# Patient Record
Sex: MALE | Race: WHITE | Employment: OTHER | URBAN - METROPOLITAN AREA
[De-identification: names, ages, dates, MRNs, and addresses within clinical notes are randomized per-mention and may not be internally consistent; named-entity substitution may affect disease eponyms.]

---

## 2018-09-02 ENCOUNTER — HOSPITAL ENCOUNTER (EMERGENCY)
Age: 63
Discharge: HOME | End: 2018-09-02
Attending: EMERGENCY MEDICINE
Payer: COMMERCIAL

## 2018-09-02 ENCOUNTER — APPOINTMENT (OUTPATIENT)
Dept: CT IMAGING | Age: 63
End: 2018-09-02
Payer: COMMERCIAL

## 2018-09-02 ENCOUNTER — APPOINTMENT (OUTPATIENT)
Dept: GENERAL RADIOLOGY | Age: 63
End: 2018-09-02
Payer: COMMERCIAL

## 2018-09-02 VITALS
HEIGHT: 68 IN | OXYGEN SATURATION: 100 % | TEMPERATURE: 99.3 F | RESPIRATION RATE: 18 BRPM | BODY MASS INDEX: 30.01 KG/M2 | SYSTOLIC BLOOD PRESSURE: 143 MMHG | HEART RATE: 79 BPM | WEIGHT: 198 LBS | DIASTOLIC BLOOD PRESSURE: 83 MMHG

## 2018-09-02 DIAGNOSIS — R31.9 HEMATURIA, UNSPECIFIED TYPE: ICD-10-CM

## 2018-09-02 DIAGNOSIS — J18.9 PNEUMONIA DUE TO ORGANISM: Primary | ICD-10-CM

## 2018-09-02 DIAGNOSIS — R91.1 PULMONARY NODULE: ICD-10-CM

## 2018-09-02 LAB
A/G RATIO: 1.3 (ref 1.1–2.2)
ALBUMIN SERPL-MCNC: 4.3 G/DL (ref 3.4–5)
ALP BLD-CCNC: 79 U/L (ref 40–129)
ALT SERPL-CCNC: 13 U/L (ref 10–40)
ANION GAP SERPL CALCULATED.3IONS-SCNC: 10 MMOL/L (ref 3–16)
AST SERPL-CCNC: 18 U/L (ref 15–37)
BACTERIA: ABNORMAL /HPF
BASOPHILS ABSOLUTE: 0 K/UL (ref 0–0.2)
BASOPHILS RELATIVE PERCENT: 0.3 %
BILIRUB SERPL-MCNC: 1.6 MG/DL (ref 0–1)
BILIRUBIN URINE: NEGATIVE
BLOOD, URINE: ABNORMAL
BUN BLDV-MCNC: 17 MG/DL (ref 7–20)
CALCIUM SERPL-MCNC: 9.2 MG/DL (ref 8.3–10.6)
CHLORIDE BLD-SCNC: 98 MMOL/L (ref 99–110)
CLARITY: CLEAR
CO2: 27 MMOL/L (ref 21–32)
COLOR: YELLOW
CREAT SERPL-MCNC: 0.7 MG/DL (ref 0.8–1.3)
EOSINOPHILS ABSOLUTE: 0 K/UL (ref 0–0.6)
EOSINOPHILS RELATIVE PERCENT: 0.2 %
GFR AFRICAN AMERICAN: >60
GFR NON-AFRICAN AMERICAN: >60
GLOBULIN: 3.3 G/DL
GLUCOSE BLD-MCNC: 110 MG/DL (ref 70–99)
GLUCOSE URINE: NEGATIVE MG/DL
HCT VFR BLD CALC: 47.3 % (ref 40.5–52.5)
HEMOGLOBIN: 16.6 G/DL (ref 13.5–17.5)
INR BLD: 1.33 (ref 0.86–1.14)
KETONES, URINE: 15 MG/DL
LACTIC ACID: 1 MMOL/L (ref 0.4–2)
LEUKOCYTE ESTERASE, URINE: ABNORMAL
LYMPHOCYTES ABSOLUTE: 0.9 K/UL (ref 1–5.1)
LYMPHOCYTES RELATIVE PERCENT: 6.9 %
MCH RBC QN AUTO: 30 PG (ref 26–34)
MCHC RBC AUTO-ENTMCNC: 35.2 G/DL (ref 31–36)
MCV RBC AUTO: 85.4 FL (ref 80–100)
MICROSCOPIC EXAMINATION: YES
MONOCYTES ABSOLUTE: 1.2 K/UL (ref 0–1.3)
MONOCYTES RELATIVE PERCENT: 9 %
MUCUS: ABNORMAL /LPF
NEUTROPHILS ABSOLUTE: 11.3 K/UL (ref 1.7–7.7)
NEUTROPHILS RELATIVE PERCENT: 83.6 %
NITRITE, URINE: NEGATIVE
PDW BLD-RTO: 13 % (ref 12.4–15.4)
PH UA: 6
PLATELET # BLD: 150 K/UL (ref 135–450)
PMV BLD AUTO: 6.7 FL (ref 5–10.5)
POTASSIUM SERPL-SCNC: 4.9 MMOL/L (ref 3.5–5.1)
PROTEIN UA: 30 MG/DL
PROTHROMBIN TIME: 15.2 SEC (ref 9.8–13)
RBC # BLD: 5.54 M/UL (ref 4.2–5.9)
RBC UA: ABNORMAL /HPF (ref 0–2)
SODIUM BLD-SCNC: 135 MMOL/L (ref 136–145)
SPECIFIC GRAVITY UA: 1.02
TOTAL PROTEIN: 7.6 G/DL (ref 6.4–8.2)
URINE REFLEX TO CULTURE: YES
URINE TYPE: ABNORMAL
UROBILINOGEN, URINE: 0.2 E.U./DL
WBC # BLD: 13.5 K/UL (ref 4–11)
WBC UA: ABNORMAL /HPF (ref 0–5)

## 2018-09-02 PROCEDURE — 99284 EMERGENCY DEPT VISIT MOD MDM: CPT

## 2018-09-02 PROCEDURE — 2580000003 HC RX 258: Performed by: NURSE PRACTITIONER

## 2018-09-02 PROCEDURE — 96361 HYDRATE IV INFUSION ADD-ON: CPT

## 2018-09-02 PROCEDURE — 71046 X-RAY EXAM CHEST 2 VIEWS: CPT

## 2018-09-02 PROCEDURE — 85025 COMPLETE CBC W/AUTO DIFF WBC: CPT

## 2018-09-02 PROCEDURE — 6360000002 HC RX W HCPCS: Performed by: NURSE PRACTITIONER

## 2018-09-02 PROCEDURE — 83605 ASSAY OF LACTIC ACID: CPT

## 2018-09-02 PROCEDURE — 80053 COMPREHEN METABOLIC PANEL: CPT

## 2018-09-02 PROCEDURE — 6360000004 HC RX CONTRAST MEDICATION: Performed by: NURSE PRACTITIONER

## 2018-09-02 PROCEDURE — 6370000000 HC RX 637 (ALT 250 FOR IP): Performed by: NURSE PRACTITIONER

## 2018-09-02 PROCEDURE — 81001 URINALYSIS AUTO W/SCOPE: CPT

## 2018-09-02 PROCEDURE — 87086 URINE CULTURE/COLONY COUNT: CPT

## 2018-09-02 PROCEDURE — 85610 PROTHROMBIN TIME: CPT

## 2018-09-02 PROCEDURE — 74177 CT ABD & PELVIS W/CONTRAST: CPT

## 2018-09-02 PROCEDURE — 87040 BLOOD CULTURE FOR BACTERIA: CPT

## 2018-09-02 PROCEDURE — 96374 THER/PROPH/DIAG INJ IV PUSH: CPT

## 2018-09-02 RX ORDER — MORPHINE SULFATE 2 MG/ML
4 INJECTION, SOLUTION INTRAMUSCULAR; INTRAVENOUS ONCE
Status: DISCONTINUED | OUTPATIENT
Start: 2018-09-02 | End: 2018-09-02 | Stop reason: HOSPADM

## 2018-09-02 RX ORDER — ACETAMINOPHEN 325 MG/1
650 TABLET ORAL ONCE
Status: COMPLETED | OUTPATIENT
Start: 2018-09-02 | End: 2018-09-02

## 2018-09-02 RX ORDER — CEFDINIR 300 MG/1
300 CAPSULE ORAL ONCE
Status: COMPLETED | OUTPATIENT
Start: 2018-09-02 | End: 2018-09-02

## 2018-09-02 RX ORDER — CEFDINIR 300 MG/1
300 CAPSULE ORAL 2 TIMES DAILY
Qty: 20 CAPSULE | Refills: 0 | Status: SHIPPED | OUTPATIENT
Start: 2018-09-02 | End: 2018-09-04

## 2018-09-02 RX ORDER — ONDANSETRON 2 MG/ML
4 INJECTION INTRAMUSCULAR; INTRAVENOUS ONCE
Status: COMPLETED | OUTPATIENT
Start: 2018-09-02 | End: 2018-09-02

## 2018-09-02 RX ORDER — 0.9 % SODIUM CHLORIDE 0.9 %
1000 INTRAVENOUS SOLUTION INTRAVENOUS ONCE
Status: COMPLETED | OUTPATIENT
Start: 2018-09-02 | End: 2018-09-02

## 2018-09-02 RX ADMIN — SODIUM CHLORIDE 1000 ML: 9 INJECTION, SOLUTION INTRAVENOUS at 15:51

## 2018-09-02 RX ADMIN — ONDANSETRON HYDROCHLORIDE 4 MG: 2 INJECTION, SOLUTION INTRAMUSCULAR; INTRAVENOUS at 15:51

## 2018-09-02 RX ADMIN — ACETAMINOPHEN 650 MG: 325 TABLET ORAL at 15:51

## 2018-09-02 RX ADMIN — IOPAMIDOL 75 ML: 755 INJECTION, SOLUTION INTRAVENOUS at 16:10

## 2018-09-02 RX ADMIN — CEFDINIR 300 MG: 300 CAPSULE ORAL at 17:05

## 2018-09-02 ASSESSMENT — PAIN DESCRIPTION - DESCRIPTORS: DESCRIPTORS: DISCOMFORT

## 2018-09-02 ASSESSMENT — ENCOUNTER SYMPTOMS
SHORTNESS OF BREATH: 0
ABDOMINAL PAIN: 1

## 2018-09-02 ASSESSMENT — PAIN SCALES - GENERAL
PAINLEVEL_OUTOF10: 4
PAINLEVEL_OUTOF10: 4

## 2018-09-02 ASSESSMENT — PAIN DESCRIPTION - ORIENTATION: ORIENTATION: LOWER

## 2018-09-02 ASSESSMENT — PAIN DESCRIPTION - LOCATION: LOCATION: ABDOMEN

## 2018-09-02 ASSESSMENT — PAIN DESCRIPTION - FREQUENCY: FREQUENCY: CONTINUOUS

## 2018-09-02 ASSESSMENT — PAIN DESCRIPTION - PAIN TYPE: TYPE: ACUTE PAIN

## 2018-09-02 NOTE — ED NOTES
Chief Complaint   Patient presents with    Hematuria     Hematuria started on Wed. Now having fevers. Pt placed in room 7. Pt states that he has lower abd pain and has had a cough. Bed in low position, call light in reach. Family at bedside.       Mariaelena Iyer RN  09/02/18 9292

## 2018-09-02 NOTE — ED NOTES
Dr. Kieran Geller at bedside evaluating patient at this time. Will continue to monitor.       Jennifer Luna RN  09/02/18 6515

## 2018-09-02 NOTE — ED PROVIDER NOTES
[address] ED  eMERGENCY dEPARTMENT eNCOUnter        Pt Name: Amy Darby  MRN: [id number]  [place] 1955  Date of evaluation: 9/2/2018  Provider: REAGAN Leyva CNP  PCP: Liliana Mobley MD  ED Attending: No att. providers found    [address] [place] [address]       Chief Complaint   Patient presents with    Hematuria     Hematuria started on Wed. Now having fevers. HISTORY OF PRESENT ILLNESS   (Location/Symptom, Timing/Onset, Context/Setting, Quality, Duration, Modifying Factors, Severity)  Note limiting factors. Amy Darby is a 61 y.o. male for Hematuria. Onset was 4 days ago. Duration has been since the onset. Context includes patient reports that he started with hematuria 4 days ago and has recently developed a fever. Patient does complain of suprapubic abdominal pain. Alleviating factors include nothing. Aggravating factors include nothing. Pain is 4/10. Nothing has been used for pain today. Nursing Notes were all reviewed and agreed with or any disagreements were addressed  in the HPI. REVIEW OF SYSTEMS    (2-9 systems for level 4, 10 or more for level 5)     Review of Systems   Constitutional: Positive for fever. Respiratory: Negative for shortness of breath. Cardiovascular: Negative for chest pain. Gastrointestinal: Positive for abdominal pain. Genitourinary: Positive for hematuria. Negative for difficulty urinating and flank pain. All other systems reviewed and are negative. Positives and Pertinent negatives as per HPI. Except as noted above in the ROS, all other systems were reviewed and negative.        PAST MEDICAL HISTORY     Past Medical History:   Diagnosis Date    Arthritis     Prostate enlargement     Prostate enlargement     Sarcoidosis          SURGICAL HISTORY       Past Surgical History:   Procedure Laterality Date    COLONOSCOPY  2012    ELBOW SURGERY      HERNIA REPAIR      SHOULDER SURGERY  2005    right    Bilateral renal cysts. 4. Hepatic steatosis. 5.  Diverticulosis coli without CT evidence of acute diverticulitis. 6. Degenerative changes lumbar spine. XR CHEST STANDARD (2 VW)   Final Result   Findings as above which may be related to edema or atypical infection. No results found. PROCEDURES   Unless otherwise noted below, none     Procedures    CRITICAL CARE TIME   N/A    CONSULTS:  None      EMERGENCY DEPARTMENT COURSE and DIFFERENTIAL DIAGNOSIS/MDM:   Vitals:    Vitals:    09/02/18 1537 09/02/18 1540 09/02/18 1630 09/02/18 1648   BP: (!) 148/102 (!) 139/95 (!) 143/83    Pulse: 91  79    Resp: 14  18    Temp: 101.1 °F (38.4 °C)   99.3 °F (37.4 °C)   TempSrc: Oral   Oral   SpO2: 98% 97% 100%    Weight: 198 lb (89.8 kg)      Height: 5' 8\" (1.727 m)          Patient was given the following medications:  Medications   morphine injection 4 mg (4 mg Intravenous Not Given 9/2/18 1601)   0.9 % sodium chloride bolus (0 mLs Intravenous Stopped 9/2/18 1648)   ondansetron (ZOFRAN) injection 4 mg (4 mg Intravenous Given 9/2/18 1551)   acetaminophen (TYLENOL) tablet 650 mg (650 mg Oral Given 9/2/18 1551)   iopamidol (ISOVUE-370) 76 % injection 75 mL (75 mLs Intravenous Given 9/2/18 1610)   cefdinir (OMNICEF) capsule 300 mg (300 mg Oral Given 9/2/18 1705)       Patient seen and evaluated by myself and Dr. Dia Elaine. Patient here today for hematuria. Patient reports that he had hematuria on Wednesday however hematuria or should have resolved itself patient now has a fever. Patient does complain of suprapubic abdominal pain. On exam he is awake and alert patient was febrile on arrival that has improved with a dose of Tylenol. His lab values and imaging available been reviewed and interpreted. Patient be treated with [facility] [place] for a pneumonia.   He was given strict instructions to follow up with his primary care doctor for possible repeat imaging of his chest to ensure that this was a pneumonia

## 2018-09-02 NOTE — ED NOTES
Urine collected and sent to lab with slips. Will continue to monitor.         Juliann Malin RN  09/02/18 0841

## 2018-09-04 ENCOUNTER — HOSPITAL ENCOUNTER (EMERGENCY)
Age: 63
Discharge: HOME | End: 2018-09-04
Attending: EMERGENCY MEDICINE
Payer: COMMERCIAL

## 2018-09-04 ENCOUNTER — TELEPHONE (OUTPATIENT)
Dept: CASE MANAGEMENT | Age: 63
End: 2018-09-04

## 2018-09-04 ENCOUNTER — APPOINTMENT (OUTPATIENT)
Dept: GENERAL RADIOLOGY | Age: 63
End: 2018-09-04
Payer: COMMERCIAL

## 2018-09-04 VITALS
DIASTOLIC BLOOD PRESSURE: 77 MMHG | RESPIRATION RATE: 20 BRPM | HEIGHT: 68 IN | OXYGEN SATURATION: 96 % | TEMPERATURE: 98.1 F | HEART RATE: 90 BPM | BODY MASS INDEX: 28.95 KG/M2 | WEIGHT: 191 LBS | SYSTOLIC BLOOD PRESSURE: 112 MMHG

## 2018-09-04 DIAGNOSIS — R53.83 TIREDNESS: ICD-10-CM

## 2018-09-04 DIAGNOSIS — R06.02 SHORTNESS OF BREATH: ICD-10-CM

## 2018-09-04 DIAGNOSIS — J18.9 PNEUMONIA OF RIGHT LOWER LOBE DUE TO INFECTIOUS ORGANISM: Primary | ICD-10-CM

## 2018-09-04 LAB
A/G RATIO: 1.1 (ref 1.1–2.2)
ALBUMIN SERPL-MCNC: 3.9 G/DL (ref 3.4–5)
ALP BLD-CCNC: 81 U/L (ref 40–129)
ALT SERPL-CCNC: 18 U/L (ref 10–40)
ANION GAP SERPL CALCULATED.3IONS-SCNC: 11 MMOL/L (ref 3–16)
AST SERPL-CCNC: 18 U/L (ref 15–37)
BASE EXCESS VENOUS: 2.4 MMOL/L (ref -3–3)
BASOPHILS ABSOLUTE: 0 K/UL (ref 0–0.2)
BASOPHILS RELATIVE PERCENT: 0.2 %
BILIRUB SERPL-MCNC: 0.8 MG/DL (ref 0–1)
BUN BLDV-MCNC: 11 MG/DL (ref 7–20)
CALCIUM SERPL-MCNC: 9.1 MG/DL (ref 8.3–10.6)
CARBOXYHEMOGLOBIN: 2.4 % (ref 0–1.5)
CHLORIDE BLD-SCNC: 100 MMOL/L (ref 99–110)
CO2: 28 MMOL/L (ref 21–32)
CREAT SERPL-MCNC: 0.7 MG/DL (ref 0.8–1.3)
EOSINOPHILS ABSOLUTE: 0.2 K/UL (ref 0–0.6)
EOSINOPHILS RELATIVE PERCENT: 1.6 %
GFR AFRICAN AMERICAN: >60
GFR NON-AFRICAN AMERICAN: >60
GLOBULIN: 3.4 G/DL
GLUCOSE BLD-MCNC: 101 MG/DL (ref 70–99)
HCO3 VENOUS: 28.8 MMOL/L (ref 23–29)
HCT VFR BLD CALC: 43.8 % (ref 40.5–52.5)
HEMOGLOBIN: 15.4 G/DL (ref 13.5–17.5)
LACTIC ACID: 1 MMOL/L (ref 0.4–2)
LYMPHOCYTES ABSOLUTE: 0.9 K/UL (ref 1–5.1)
LYMPHOCYTES RELATIVE PERCENT: 7.7 %
MCH RBC QN AUTO: 29.7 PG (ref 26–34)
MCHC RBC AUTO-ENTMCNC: 35.2 G/DL (ref 31–36)
MCV RBC AUTO: 84.4 FL (ref 80–100)
METHEMOGLOBIN VENOUS: 0.2 %
MONOCYTES ABSOLUTE: 1.5 K/UL (ref 0–1.3)
MONOCYTES RELATIVE PERCENT: 12.4 %
NEUTROPHILS ABSOLUTE: 9.3 K/UL (ref 1.7–7.7)
NEUTROPHILS RELATIVE PERCENT: 78.1 %
O2 CONTENT, VEN: 16 VOL %
O2 SAT, VEN: 71 %
O2 THERAPY: ABNORMAL
PCO2, VEN: 50.9 MMHG (ref 40–50)
PDW BLD-RTO: 12.9 % (ref 12.4–15.4)
PH VENOUS: 7.37 (ref 7.35–7.45)
PLATELET # BLD: 176 K/UL (ref 135–450)
PMV BLD AUTO: 6.9 FL (ref 5–10.5)
PO2, VEN: 38.5 MMHG (ref 25–40)
POTASSIUM REFLEX MAGNESIUM: 4.3 MMOL/L (ref 3.5–5.1)
PRO-BNP: 59 PG/ML (ref 0–124)
RBC # BLD: 5.18 M/UL (ref 4.2–5.9)
SODIUM BLD-SCNC: 139 MMOL/L (ref 136–145)
TCO2 CALC VENOUS: 30 MMOL/L
TOTAL PROTEIN: 7.3 G/DL (ref 6.4–8.2)
TROPONIN: <0.01 NG/ML
URINE CULTURE, ROUTINE: NORMAL
WBC # BLD: 11.9 K/UL (ref 4–11)

## 2018-09-04 PROCEDURE — 83605 ASSAY OF LACTIC ACID: CPT

## 2018-09-04 PROCEDURE — 80053 COMPREHEN METABOLIC PANEL: CPT

## 2018-09-04 PROCEDURE — 71046 X-RAY EXAM CHEST 2 VIEWS: CPT

## 2018-09-04 PROCEDURE — 96368 THER/DIAG CONCURRENT INF: CPT

## 2018-09-04 PROCEDURE — 96375 TX/PRO/DX INJ NEW DRUG ADDON: CPT

## 2018-09-04 PROCEDURE — 85025 COMPLETE CBC W/AUTO DIFF WBC: CPT

## 2018-09-04 PROCEDURE — 99285 EMERGENCY DEPT VISIT HI MDM: CPT

## 2018-09-04 PROCEDURE — 87040 BLOOD CULTURE FOR BACTERIA: CPT

## 2018-09-04 PROCEDURE — 2580000003 HC RX 258: Performed by: EMERGENCY MEDICINE

## 2018-09-04 PROCEDURE — 83880 ASSAY OF NATRIURETIC PEPTIDE: CPT

## 2018-09-04 PROCEDURE — 96365 THER/PROPH/DIAG IV INF INIT: CPT

## 2018-09-04 PROCEDURE — 6370000000 HC RX 637 (ALT 250 FOR IP): Performed by: EMERGENCY MEDICINE

## 2018-09-04 PROCEDURE — 6360000002 HC RX W HCPCS: Performed by: EMERGENCY MEDICINE

## 2018-09-04 PROCEDURE — 84484 ASSAY OF TROPONIN QUANT: CPT

## 2018-09-04 PROCEDURE — 82803 BLOOD GASES ANY COMBINATION: CPT

## 2018-09-04 PROCEDURE — 2580000003 HC RX 258

## 2018-09-04 PROCEDURE — 93005 ELECTROCARDIOGRAM TRACING: CPT | Performed by: EMERGENCY MEDICINE

## 2018-09-04 RX ORDER — HYDROXYZINE HYDROCHLORIDE 25 MG/1
25 TABLET, FILM COATED ORAL 3 TIMES DAILY PRN
Qty: 30 TABLET | Refills: 0 | Status: SHIPPED | OUTPATIENT
Start: 2018-09-04 | End: 2018-09-14

## 2018-09-04 RX ORDER — AZITHROMYCIN 250 MG/1
500 TABLET, FILM COATED ORAL ONCE
Status: DISCONTINUED | OUTPATIENT
Start: 2018-09-04 | End: 2018-09-04

## 2018-09-04 RX ORDER — IPRATROPIUM BROMIDE AND ALBUTEROL SULFATE 2.5; .5 MG/3ML; MG/3ML
1 SOLUTION RESPIRATORY (INHALATION) ONCE
Status: COMPLETED | OUTPATIENT
Start: 2018-09-04 | End: 2018-09-04

## 2018-09-04 RX ORDER — AMOXICILLIN AND CLAVULANATE POTASSIUM 875; 125 MG/1; MG/1
1 TABLET, FILM COATED ORAL ONCE
Status: DISCONTINUED | OUTPATIENT
Start: 2018-09-04 | End: 2018-09-04

## 2018-09-04 RX ORDER — INDOMETHACIN 25 MG/1
25 CAPSULE ORAL ONCE
COMMUNITY

## 2018-09-04 RX ORDER — AZITHROMYCIN 250 MG/1
TABLET, FILM COATED ORAL
Qty: 1 PACKET | Refills: 0 | Status: SHIPPED | OUTPATIENT
Start: 2018-09-04 | End: 2018-09-08

## 2018-09-04 RX ORDER — DEXAMETHASONE SODIUM PHOSPHATE 10 MG/ML
10 INJECTION INTRAMUSCULAR; INTRAVENOUS ONCE
Status: COMPLETED | OUTPATIENT
Start: 2018-09-04 | End: 2018-09-04

## 2018-09-04 RX ORDER — AMOXICILLIN AND CLAVULANATE POTASSIUM 875; 125 MG/1; MG/1
1 TABLET, FILM COATED ORAL 2 TIMES DAILY
Qty: 20 TABLET | Refills: 0 | Status: SHIPPED | OUTPATIENT
Start: 2018-09-04 | End: 2018-09-14

## 2018-09-04 RX ORDER — BENZONATATE 100 MG/1
200 CAPSULE ORAL 3 TIMES DAILY PRN
Qty: 30 CAPSULE | Refills: 0 | Status: SHIPPED | OUTPATIENT
Start: 2018-09-04 | End: 2018-09-11

## 2018-09-04 RX ORDER — 0.9 % SODIUM CHLORIDE 0.9 %
500 INTRAVENOUS SOLUTION INTRAVENOUS ONCE
Status: COMPLETED | OUTPATIENT
Start: 2018-09-04 | End: 2018-09-04

## 2018-09-04 RX ORDER — ALBUTEROL SULFATE 90 UG/1
2 AEROSOL, METERED RESPIRATORY (INHALATION) EVERY 4 HOURS PRN
Qty: 1 INHALER | Refills: 3 | Status: SHIPPED | OUTPATIENT
Start: 2018-09-04

## 2018-09-04 RX ORDER — SODIUM CHLORIDE 9 MG/ML
INJECTION, SOLUTION INTRAVENOUS
Status: COMPLETED
Start: 2018-09-04 | End: 2018-09-04

## 2018-09-04 RX ADMIN — DEXAMETHASONE SODIUM PHOSPHATE 10 MG: 10 INJECTION, SOLUTION INTRAMUSCULAR; INTRAVENOUS at 18:04

## 2018-09-04 RX ADMIN — SODIUM CHLORIDE 500 ML: 9 INJECTION, SOLUTION INTRAVENOUS at 18:14

## 2018-09-04 RX ADMIN — Medication 500 ML: at 18:14

## 2018-09-04 RX ADMIN — CEFTRIAXONE SODIUM 1 G: 1 INJECTION, POWDER, FOR SOLUTION INTRAMUSCULAR; INTRAVENOUS at 18:04

## 2018-09-04 RX ADMIN — IPRATROPIUM BROMIDE AND ALBUTEROL SULFATE 1 AMPULE: .5; 3 SOLUTION RESPIRATORY (INHALATION) at 18:04

## 2018-09-04 RX ADMIN — DEXTROSE MONOHYDRATE 500 MG: 50 INJECTION, SOLUTION INTRAVENOUS at 18:13

## 2018-09-04 ASSESSMENT — PAIN DESCRIPTION - PAIN TYPE: TYPE: ACUTE PAIN

## 2018-09-04 ASSESSMENT — PAIN SCALES - GENERAL: PAINLEVEL_OUTOF10: 3

## 2018-09-04 ASSESSMENT — PAIN DESCRIPTION - LOCATION: LOCATION: ABDOMEN

## 2018-09-04 NOTE — ED PROVIDER NOTES
I independently performed a history and physical on Bennett Essex. All diagnostic, treatment, and disposition decisions were made by myself in conjunction with the advanced practice provider. Briefly the patient's history and exam was the following: For further details of Halina Goldstein's emergency department encounter, please see Avery Ambrose NP's documentation. Patient presents to ED with complaints of hematuria, fever, and cough. Patient states 4-5 days ago he had 2 episodes of passing clots in his urine followed by spraying of urine. Urine was grossly bloody at that time for 2 days but has since started clearing. He experienced some dysurai at that time but this too is gone. No abdominal pain or flank pain. The last last 2-3 days he developed fever, myalgias, cough, and mild sob. No chest pain. No vomiting or diarrhea. Has h/o prostate biopsy a few years ago. No known prostate cancer. Not on blood thinners. EXAM  Heart RRR, no murmurs  Lungs rales. RLL. No wheezing. No accessory muscle use  Abdomen nontender. No guarding. No rebound. No edema    ED Triage Vitals [09/02/18 1537]   Enc Vitals Group      BP (!) 148/102      Pulse 91      Resp 14      Temp 101.1 °F (38.4 °C)      Temp Source Oral      SpO2 98 %      Weight 198 lb (89.8 kg)      Height 5' 8\" (1.727 m)      Head Circumference       Peak Flow       Pain Score       Pain Loc       Pain Edu? Excl. in [address]? CT ABDOMEN PELVIS W IV CONTRAST Additional Contrast? None   Final Result   1. Nonspecific nodularity right lower lobe. Infectious or neoplastic   processes, versus chronic pulmonary sequela are considerations. CT chest   correlation should be considered. 2. Nonspecific mildly thick walled appearance of the decompressed urinary   bladder. Cystitis is a consideration. Correlate with urinalysis. 3. No renal mass or urinary collecting system calculus. Bilateral renal cysts. 4. Hepatic steatosis.    5. Diverticulosis coli without CT evidence of acute diverticulitis. 6. Degenerative changes lumbar spine. XR CHEST STANDARD (2 VW)   Final Result   Findings as above which may be related to edema or atypical infection. Results for orders placed or performed during the hospital encounter of 09/02/18   Culture Blood #1   Result Value Ref Range    Blood Culture, Routine       No Growth to date. Any change in status will be called. Culture Blood #2   Result Value Ref Range    Culture, Blood 2       No Growth to date. Any change in status will be called. Urine Culture   Result Value Ref Range    Urine Culture, Routine       >50,000 CFU/ml mixed skin/urogenital karen.  No further workup   CBC auto differential   Result Value Ref Range    WBC 13.5 (H) 4.0 - 11.0 K/uL    RBC 5.54 4.20 - 5.90 M/uL    Hemoglobin 16.6 13.5 - 17.5 g/dL    Hematocrit 47.3 40.5 - 52.5 %    MCV 85.4 80.0 - 100.0 fL    MCH 30.0 26.0 - 34.0 pg    MCHC 35.2 31.0 - 36.0 g/dL    RDW 13.0 12.4 - 15.4 %    Platelets 541 942 - 380 K/uL    MPV 6.7 5.0 - 10.5 fL    Neutrophils % 83.6 %    Lymphocytes % 6.9 %    Monocytes % 9.0 %    Eosinophils % 0.2 %    Basophils % 0.3 %    Neutrophils # 11.3 (H) 1.7 - 7.7 K/uL    Lymphocytes # 0.9 (L) 1.0 - 5.1 K/uL    Monocytes # 1.2 0.0 - 1.3 K/uL    Eosinophils # 0.0 0.0 - 0.6 K/uL    Basophils # 0.0 0.0 - 0.2 K/uL   Comprehensive metabolic panel   Result Value Ref Range    Sodium 135 (L) 136 - 145 mmol/L    Potassium 4.9 3.5 - 5.1 mmol/L    Chloride 98 (L) 99 - 110 mmol/L    CO2 27 21 - 32 mmol/L    Anion Gap 10 3 - 16    Glucose 110 (H) 70 - 99 mg/dL    BUN 17 7 - 20 mg/dL    CREATININE 0.7 (L) 0.8 - 1.3 mg/dL    GFR Non-African American >60 >60    GFR African American >60 >60    Calcium 9.2 8.3 - 10.6 mg/dL    Total Protein 7.6 6.4 - 8.2 g/dL    Alb 4.3 3.4 - 5.0 g/dL    Albumin/Globulin Ratio 1.3 1.1 - 2.2    Total Bilirubin 1.6 (H) 0.0 - 1.0 mg/dL    Alkaline Phosphatase 79 40 - 129 U/L ALT 13 10 - 40 U/L    AST 18 15 - 37 U/L    Globulin 3.3 g/dL   Urine, reflex to culture   Result Value Ref Range    Color, UA Yellow Straw/Yellow    Clarity, UA Clear Clear    Glucose, Ur Negative Negative mg/dL    Bilirubin Urine Negative Negative    Ketones, Urine 15 (A) Negative mg/dL    Specific Gravity, UA 1.025 1.005 - 1.030    Blood, Urine MODERATE (A) Negative    pH, UA 6.0 5.0 - 8.0    Protein, UA 30 (A) Negative mg/dL    Urobilinogen, Urine 0.2 <2.0 E.U./dL    Nitrite, Urine Negative Negative    Leukocyte Esterase, Urine TRACE (A) Negative    Microscopic Examination YES     Urine Reflex to Culture Yes     Urine Type Not Specified    Protime-INR   Result Value Ref Range    Protime 15.2 (H) 9.8 - 13.0 sec    INR 1.33 (H) 0.86 - 1.14   Lactic Acid, Plasma   Result Value Ref Range    Lactic Acid 1.0 0.4 - 2.0 mmol/L   Microscopic Urinalysis   Result Value Ref Range    Mucus, UA 3+ (A) /LPF    WBC, UA 6-10 (A) 0 - 5 /HPF    RBC, UA 5-10 (A) 0 - 2 /HPF    Bacteria, UA 2+ (A) /HPF       Medical decision making:  Patient with hematuria and previous clot retention now resolving. Currently not having any retention and UA with mild hematuria/pyuria. Urine will be cultured. No hydro or stone. Bladder thickened, could be due to infection but in setting of gross hematuria recommended repeat urology eval for possible cystoscopy. Has fever today but also complaining of new cough and mild sob. Possible pneumonia on contrast abdominal CT although not well seen on CXR. Cannot rule out malignancy but since he has already had dye study today will  patient on having repeat CT cheset after completion of antibiotics to better delineate whether findings reporesent pneumonia or other pathology. patinet understands. Not hypoxic. No increased work of breathing. No lactic acidosis or hypotension. Will place on omnicef for pneumonia and UTI coverage. Stable for discharge with precautions    1. Pneumonia due to organism    2.

## 2018-09-04 NOTE — LETTER
[facility]  [address] [id number]  Phone: [phone]               September 4, 2018    Patient: Marques Lai   YOB: 1955   Date of Visit: 9/4/2018       To Whom It May Concern: Mi Escamilla was seen and treated in our emergency department on 9/4/2018.  He may return to work on 9/8/18    Sincerely,       Gay Hook RN         Signature:__________________________________

## 2018-09-05 LAB
EKG ATRIAL RATE: 88 BPM
EKG DIAGNOSIS: NORMAL
EKG P AXIS: 28 DEGREES
EKG P-R INTERVAL: 136 MS
EKG Q-T INTERVAL: 386 MS
EKG QRS DURATION: 144 MS
EKG QTC CALCULATION (BAZETT): 467 MS
EKG R AXIS: -65 DEGREES
EKG T AXIS: -4 DEGREES
EKG VENTRICULAR RATE: 88 BPM

## 2018-09-05 PROCEDURE — 93010 ELECTROCARDIOGRAM REPORT: CPT | Performed by: INTERNAL MEDICINE

## 2018-09-05 NOTE — ED PROVIDER NOTES
equally. All extremities neurovascularly intact. No calf tenderness. SKIN: Warm and dry. No acute rashes. NEUROLOGICAL: Alert and oriented. No gross facial drooping. Strength 5/5, sensation intact. nml speech  PSYCHIATRIC: Normal mood and flat affect. LABS  I have reviewed all labs for this visit.    Results for orders placed or performed during the hospital encounter of 09/04/18   CBC auto differential   Result Value Ref Range    WBC 11.9 (H) 4.0 - 11.0 K/uL    RBC 5.18 4.20 - 5.90 M/uL    Hemoglobin 15.4 13.5 - 17.5 g/dL    Hematocrit 43.8 40.5 - 52.5 %    MCV 84.4 80.0 - 100.0 fL    MCH 29.7 26.0 - 34.0 pg    MCHC 35.2 31.0 - 36.0 g/dL    RDW 12.9 12.4 - 15.4 %    Platelets 754 854 - 050 K/uL    MPV 6.9 5.0 - 10.5 fL    Neutrophils % 78.1 %    Lymphocytes % 7.7 %    Monocytes % 12.4 %    Eosinophils % 1.6 %    Basophils % 0.2 %    Neutrophils # 9.3 (H) 1.7 - 7.7 K/uL    Lymphocytes # 0.9 (L) 1.0 - 5.1 K/uL    Monocytes # 1.5 (H) 0.0 - 1.3 K/uL    Eosinophils # 0.2 0.0 - 0.6 K/uL    Basophils # 0.0 0.0 - 0.2 K/uL   Comprehensive Metabolic Panel w/ Reflex to MG   Result Value Ref Range    Sodium 139 136 - 145 mmol/L    Potassium reflex Magnesium 4.3 3.5 - 5.1 mmol/L    Chloride 100 99 - 110 mmol/L    CO2 28 21 - 32 mmol/L    Anion Gap 11 3 - 16    Glucose 101 (H) 70 - 99 mg/dL    BUN 11 7 - 20 mg/dL    CREATININE 0.7 (L) 0.8 - 1.3 mg/dL    GFR Non-African American >60 >60    GFR African American >60 >60    Calcium 9.1 8.3 - 10.6 mg/dL    Total Protein 7.3 6.4 - 8.2 g/dL    Alb 3.9 3.4 - 5.0 g/dL    Albumin/Globulin Ratio 1.1 1.1 - 2.2    Total Bilirubin 0.8 0.0 - 1.0 mg/dL    Alkaline Phosphatase 81 40 - 129 U/L    ALT 18 10 - 40 U/L    AST 18 15 - 37 U/L    Globulin 3.4 g/dL   Blood gas, venous   Result Value Ref Range    pH, Ed 7.371 7.350 - 7.450    pCO2, Ed 50.9 (H) 40.0 - 50.0 mmHg    pO2, Ed 38.5 25.0 - 40.0 mmHg    HCO3, Venous 28.8 23.0 - 29.0 mmol/L    Base Excess, Ed 2.4 -3.0 - 3.0 mmol/L    O2 Sat, Ed 71 Not Established %    Carboxyhemoglobin 2.4 (H) 0.0 - 1.5 %    MetHgb, Ed 0.2 <1.5 %    TC02 (Calc), Ed 30 Not Established mmol/L    O2 Content, Ed 16 Not Established VOL %    O2 Therapy Unknown    Lactic acid, plasma   Result Value Ref Range    Lactic Acid 1.0 0.4 - 2.0 mmol/L   Troponin   Result Value Ref Range    Troponin <0.01 <0.01 ng/mL   Brain natriuretic peptide   Result Value Ref Range    Pro-BNP 59 0 - 124 pg/mL   EKG 12 lead   Result Value Ref Range    Ventricular Rate 88 BPM    Atrial Rate 88 BPM    P-R Interval 136 ms    QRS Duration 144 ms    Q-T Interval 386 ms    QTc Calculation (Bazett) 467 ms    P Axis 28 degrees    R Axis -65 degrees    T Axis -4 degrees    Diagnosis       Normal sinus rhythmRight bundle branch blockLeft anterior fascicular block** Bifascicular block **Abnormal ECG       EKG  The Ekg interpreted by me shows  normal sinus rhythm with a rate of 88  Axis is   Left axis deviation  QTc is  within an acceptable range  Intervals and Durations are unremarkable. 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 pt states that he has had increased cough and sob. ) Acuity: Acute Type of Exam: Initial FINDINGS: There is patchy opacity within the right mid to lower lung, with an associated small pleural effusion (best visualized on the lateral examination. If anything, that opacity appears increased when compared to the previous exam, as does the pleural fluid. Left lung remains grossly clear. No pneumothorax. No free air.   The heart and mediastinal contours are unremarkable in appearance. Increasing airspace disease in the right mid to lower lung, with increased pleural fluid, concerning for worsening pneumonia. That must be followed to resolution. ED COURSE/MDM  Patient seen and evaluated. Old records reviewed. Labs and imaging reviewed and results discussed with patient. CXR w/ worsening R sided PNA, will provide more coverage w/ abx, was only on omnicef, labs basically unremarkable, walking pulse ox wnl, EKG abnormal but baseline, no CP, nml trop, no ischemic change, low risk wells score for PE, pt given augmentin and azithro for abx for home,  he does not require admission at this time based on CURB-65, given rocephin azithro here, given duonebs, steroids, as I suspect some COPD component, pt is a former smoker w/ a little wheezing, along w/ anxiety, nml lactate, ph 7.37 w/o significant acidosis, nml mentation, VSS, given multiple meds for symptom management at home as well, Strict return precautions given, will return if any worsening symptoms or new concerns, patient verbalized understanding of plan, felt comfortable going home.     Orders Placed This Encounter   Procedures    Culture blood #2    Culture Blood #1    XR CHEST STANDARD (2 VW)    CBC auto differential    Comprehensive Metabolic Panel w/ Reflex to MG    Blood gas, venous    Lactic acid, plasma    Troponin    Brain natriuretic peptide    Ambulate patient    Ambulate patient    EKG 12 lead     Orders Placed This Encounter   Medications    DISCONTD: amoxicillin-clavulanate (AUGMENTIN) 875-125 MG per tablet 1 tablet    DISCONTD: azithromycin (ZITHROMAX) tablet 500 mg    ipratropium-albuterol (DUONEB) nebulizer solution 1 ampule    dexamethasone (DECADRON) injection 10 mg    0.9 % sodium chloride bolus    cefTRIAXone (ROCEPHIN) 1 g IVPB in 50 mL D5W minibag    azithromycin (ZITHROMAX) 500 mg in D5W 250ml addavial    sodium chloride 0.9 % infusion     KEREN LUDWIG: bostont CLINICAL IMPRESSION  1. Pneumonia of right lower lobe due to infectious organism ([address]    2. Shortness of breath    3. Tiredness        Blood pressure 112/77, pulse 90, temperature 98.1 °F (36.7 °C), temperature source Oral, resp. rate 20, height 5' 8\" (1.727 m), weight 191 lb (86.6 kg), SpO2 96 %. DISPOSITION  Lee Mobley was discharged to home in stable condition.                    Malorie Kebede DO  09/13/18 5609

## 2018-09-07 LAB
BLOOD CULTURE, ROUTINE: NORMAL
CULTURE, BLOOD 2: NORMAL

## 2018-09-09 LAB
BLOOD CULTURE, ROUTINE: NORMAL
CULTURE, BLOOD 2: NORMAL

## 2019-10-04 ENCOUNTER — APPOINTMENT (OUTPATIENT)
Dept: CT IMAGING | Age: 64
End: 2019-10-04
Payer: COMMERCIAL

## 2019-10-04 ENCOUNTER — HOSPITAL ENCOUNTER (EMERGENCY)
Age: 64
Discharge: HOME | End: 2019-10-04
Attending: EMERGENCY MEDICINE
Payer: COMMERCIAL

## 2019-10-04 ENCOUNTER — APPOINTMENT (OUTPATIENT)
Dept: GENERAL RADIOLOGY | Age: 64
End: 2019-10-04
Payer: COMMERCIAL

## 2019-10-04 VITALS
RESPIRATION RATE: 15 BRPM | SYSTOLIC BLOOD PRESSURE: 136 MMHG | DIASTOLIC BLOOD PRESSURE: 86 MMHG | TEMPERATURE: 97.6 F | HEIGHT: 68 IN | OXYGEN SATURATION: 96 % | HEART RATE: 48 BPM | BODY MASS INDEX: 29.25 KG/M2 | WEIGHT: 193 LBS

## 2019-10-04 DIAGNOSIS — R20.2 PARESTHESIA OF LEFT ARM: Primary | ICD-10-CM

## 2019-10-04 LAB
A/G RATIO: 1.7 (ref 1.1–2.2)
ALBUMIN SERPL-MCNC: 4.7 G/DL (ref 3.4–5)
ALP BLD-CCNC: 81 U/L (ref 40–129)
ALT SERPL-CCNC: 16 U/L (ref 10–40)
ANION GAP SERPL CALCULATED.3IONS-SCNC: 8 MMOL/L (ref 3–16)
AST SERPL-CCNC: 20 U/L (ref 15–37)
BASOPHILS ABSOLUTE: 0 K/UL (ref 0–0.2)
BASOPHILS RELATIVE PERCENT: 0.5 %
BILIRUB SERPL-MCNC: 0.8 MG/DL (ref 0–1)
BUN BLDV-MCNC: 29 MG/DL (ref 7–20)
CALCIUM SERPL-MCNC: 9.3 MG/DL (ref 8.3–10.6)
CHLORIDE BLD-SCNC: 102 MMOL/L (ref 99–110)
CO2: 30 MMOL/L (ref 21–32)
CREAT SERPL-MCNC: 0.9 MG/DL (ref 0.8–1.3)
EOSINOPHILS ABSOLUTE: 0.2 K/UL (ref 0–0.6)
EOSINOPHILS RELATIVE PERCENT: 3.6 %
GFR AFRICAN AMERICAN: >60
GFR NON-AFRICAN AMERICAN: >60
GLOBULIN: 2.8 G/DL
GLUCOSE BLD-MCNC: 100 MG/DL (ref 70–99)
GLUCOSE BLD-MCNC: 96 MG/DL (ref 70–99)
HCT VFR BLD CALC: 47.9 % (ref 40.5–52.5)
HEMOGLOBIN: 16.5 G/DL (ref 13.5–17.5)
LYMPHOCYTES ABSOLUTE: 1.4 K/UL (ref 1–5.1)
LYMPHOCYTES RELATIVE PERCENT: 26.7 %
MCH RBC QN AUTO: 29.8 PG (ref 26–34)
MCHC RBC AUTO-ENTMCNC: 34.5 G/DL (ref 31–36)
MCV RBC AUTO: 86.3 FL (ref 80–100)
MONOCYTES ABSOLUTE: 0.4 K/UL (ref 0–1.3)
MONOCYTES RELATIVE PERCENT: 8 %
NEUTROPHILS ABSOLUTE: 3.2 K/UL (ref 1.7–7.7)
NEUTROPHILS RELATIVE PERCENT: 61.2 %
PDW BLD-RTO: 13 % (ref 12.4–15.4)
PERFORMED ON: NORMAL
PLATELET # BLD: 173 K/UL (ref 135–450)
PMV BLD AUTO: 7.1 FL (ref 5–10.5)
POTASSIUM REFLEX MAGNESIUM: 3.7 MMOL/L (ref 3.5–5.1)
RBC # BLD: 5.54 M/UL (ref 4.2–5.9)
SODIUM BLD-SCNC: 140 MMOL/L (ref 136–145)
TOTAL PROTEIN: 7.5 G/DL (ref 6.4–8.2)
TROPONIN: <0.01 NG/ML
WBC # BLD: 5.2 K/UL (ref 4–11)

## 2019-10-04 PROCEDURE — 70498 CT ANGIOGRAPHY NECK: CPT

## 2019-10-04 PROCEDURE — 80053 COMPREHEN METABOLIC PANEL: CPT

## 2019-10-04 PROCEDURE — 6360000004 HC RX CONTRAST MEDICATION: Performed by: EMERGENCY MEDICINE

## 2019-10-04 PROCEDURE — 93005 ELECTROCARDIOGRAM TRACING: CPT | Performed by: EMERGENCY MEDICINE

## 2019-10-04 PROCEDURE — 85025 COMPLETE CBC W/AUTO DIFF WBC: CPT

## 2019-10-04 PROCEDURE — 99284 EMERGENCY DEPT VISIT MOD MDM: CPT

## 2019-10-04 PROCEDURE — 71046 X-RAY EXAM CHEST 2 VIEWS: CPT

## 2019-10-04 PROCEDURE — 70450 CT HEAD/BRAIN W/O DYE: CPT

## 2019-10-04 PROCEDURE — 84484 ASSAY OF TROPONIN QUANT: CPT

## 2019-10-04 RX ADMIN — IOPAMIDOL 85 ML: 755 INJECTION, SOLUTION INTRAVENOUS at 09:15

## 2019-10-05 LAB
EKG ATRIAL RATE: 56 BPM
EKG DIAGNOSIS: NORMAL
EKG P AXIS: 44 DEGREES
EKG P-R INTERVAL: 162 MS
EKG Q-T INTERVAL: 450 MS
EKG QRS DURATION: 156 MS
EKG QTC CALCULATION (BAZETT): 434 MS
EKG R AXIS: -42 DEGREES
EKG T AXIS: 18 DEGREES
EKG VENTRICULAR RATE: 56 BPM

## 2019-10-05 PROCEDURE — 93010 ELECTROCARDIOGRAM REPORT: CPT | Performed by: INTERNAL MEDICINE

## 2022-06-07 ENCOUNTER — OFFICE VISIT (OUTPATIENT)
Dept: ENT CLINIC | Age: 67
End: 2022-06-07
Payer: COMMERCIAL

## 2022-06-07 ENCOUNTER — PROCEDURE VISIT (OUTPATIENT)
Dept: AUDIOLOGY | Age: 67
End: 2022-06-07
Payer: COMMERCIAL

## 2022-06-07 VITALS
HEIGHT: 67 IN | TEMPERATURE: 98.2 F | HEART RATE: 50 BPM | DIASTOLIC BLOOD PRESSURE: 79 MMHG | WEIGHT: 198 LBS | SYSTOLIC BLOOD PRESSURE: 141 MMHG | BODY MASS INDEX: 31.08 KG/M2

## 2022-06-07 DIAGNOSIS — H93.13 TINNITUS OF BOTH EARS: ICD-10-CM

## 2022-06-07 DIAGNOSIS — H90.3 SENSORINEURAL HEARING LOSS, BILATERAL: Primary | ICD-10-CM

## 2022-06-07 DIAGNOSIS — H93.13 TINNITUS OF BOTH EARS: Primary | ICD-10-CM

## 2022-06-07 DIAGNOSIS — H73.893 TYMPANIC MEMBRANE RETRACTION, BILATERAL: ICD-10-CM

## 2022-06-07 DIAGNOSIS — H90.3 SENSORINEURAL HEARING LOSS (SNHL) OF BOTH EARS: ICD-10-CM

## 2022-06-07 DIAGNOSIS — R42 DIZZINESS AND GIDDINESS: ICD-10-CM

## 2022-06-07 PROCEDURE — 92557 COMPREHENSIVE HEARING TEST: CPT | Performed by: AUDIOLOGIST

## 2022-06-07 PROCEDURE — 92567 TYMPANOMETRY: CPT | Performed by: AUDIOLOGIST

## 2022-06-07 PROCEDURE — 99204 OFFICE O/P NEW MOD 45 MIN: CPT | Performed by: OTOLARYNGOLOGY

## 2022-06-07 PROCEDURE — 1123F ACP DISCUSS/DSCN MKR DOCD: CPT | Performed by: OTOLARYNGOLOGY

## 2022-06-07 NOTE — PROGRESS NOTES
Pinky Culp   1955, 79 y.o. male   [id number]       Referring Provider: Jason Coy MD  Referral Type: In an order in [address] [place] [address]    Reason for Visit: Evaluation of the cause of disorders of hearing, tinnitus, or balance. ADULT AUDIOLOGIC EVALUATION      Pinky Culp is a 79 y.o. male seen today, 6/7/2022 , for an initial audiologic evaluation. Patient was seen by Jason Coy MD following today's evaluation. Patient was alone. AUDIOLOGIC AND OTHER PERTINENT MEDICAL HISTORY:      Pinky Culp noted tinnitus, dizziness, history of recreational noise exposure, history of head trauma and family history of hearing loss. Patient reports recent constant bilateral tinnitus which was previously intermittent. Patient has experienced dizziness over 20 years ago triggered by getting out of bed. He treated his dizziness with Meclizine. Patient has a history of noise exposure, shooting and tools, with intermittent use of hearing protection. Patient has a family history of tinnitus. Medical history is reportedly significant for head injury. Pinky Culp denied otalgia, aural fullness and history of ear surgery. Date: 6/7/2022     IMPRESSIONS:      Abnormal middle ear pressure with normal compliance in the left ear and normal pressure and compliance in the right ear. Abnormal hearing sensitivity, bilaterally, which could contribute to the tinnitus. Word understanding was excellent presented at elevated sensation levels, bilaterally. Hearing aids are recommended at this time. Follow medical recommendations of Jason Coy MD.     ASSESSMENT AND FINDINGS:     Otoscopy revealed: Clear ear canals bilaterally    RIGHT EAR:  Hearing Sensitivity:Normal hearing sensitivity to a moderate sensorineural hearing loss from 250-8000 Hz  Speech Recognition Threshold: 20 dB HL  Word Recognition:Excellent (92%), based on NU-6 25-word list at 60 dBHL using recorded speech stimuli.     Tympanometry: Normal peak pressure and compliance, Type A tympanogram, consistent with normal middle ear function. LEFT EAR:  Hearing Sensitivity:Normal hearing sensitivity to a moderaltey severe sensorineural hearing loss from 250-8000 Hz  Speech Recognition Threshold: 30 dB HL  Word Recognition: Excellent (96%), based on NU-6 25-word list at 70 dBHL using recorded speech stimuli. Tympanometry: Negative peak pressure with normal compliance, Type C tympanogram, consistent with ETD/history of otitis media. Reliability: Good   Transducer: Inserts (rechecked with supraural headphones)    See scanned audiogram dated 6/7/2022  for results. PATIENT EDUCATION:       The following items were discussed with the patient:   - Good Communication Strategies  - Hearing Loss and Hearing Aids  - Tinnitus Management Strategies    - Noise-Induced Hearing Loss and use of Hearing Protection Devices (HPDs)     Educational information was shared in the After Visit Summary. RECOMMENDATIONS:                                                                                                                                                                                                                                                            The following items are recommended based on patient report and results from today's appointment:   - Continue medical follow-up with Sabrina Schaumann, MD.   - Retest hearing as medically indicated and/or sooner if a change in hearing is noted. - If desired, schedule a Hearing Aid Evaluation (HAE) appointment to discuss hearing aid options. - Utilize \"Good Communication Strategies\" as discussed to assist in speech understanding with communication partners.   - Maintain a sound enriched environment to assist in the management of tinnitus symptoms.  - Use hearing protection devices (HPDs), such as protective ear muffs and ear plugs, when exposed to dangerous sound levels.        Angel Carr  Audiologist    Chart CC'd to: Sabrina Schaumann, MD      Degree of   Hearing Sensitivity dB Range   Within Normal Limits (WNL) 0 - 20   Mild 20 - 40   Moderate 40 - 55   Moderately-Severe 55 - 70   Severe 70 - 90   Profound 90 +

## 2022-06-07 NOTE — PROGRESS NOTES
[facility], [address], [address], [address] [place], [place] [address]artem Aguilar  P: [phone]       Patient     Florina Martinez  1955    Chief Concern     Chief Complaint   Patient presents with    Tinnitus     Patient is here for tinnitus in both ears. He states it has been going on for 20 years atleast. Patient staes he thought his hearing was fine but according to hearing test some hearing loss. Assessment and Plan      Diagnosis Orders   1. Tinnitus of both ears  [address] S[facility], Audiology, [facility]       No follow-ups on file. History of Present Illness     ***    Past Medical History     Past Medical History:   Diagnosis Date    Arthritis     Prostate enlargement     Prostate enlargement     Sarcoidosis        Past Surgical History     Past Surgical History:   Procedure Laterality Date    COLONOSCOPY  2012    ELBOW SURGERY      HERNIA REPAIR      SHOULDER SURGERY  2005    right    SHOULDER SURGERY Left 13    LEFT SHOULDER ARTHROSCOPY SUBACROMIAL DECOMPRESSION ROTATOR CUFF REPAIR    SHOULDER SURGERY Left 14    LEFT SHOULDER ARTHROSCOPY ROTATOR CUFF REVISION    TONSILLECTOMY      TURP         Family History     No family history on file.     Social History     Social History     Tobacco Use    Smoking status: Former Smoker     Quit date: 12/3/1987     Years since quittin.5    Smokeless tobacco: Never Used   Vaping Use    Vaping Use: Never used   Substance Use Topics    Alcohol use: No    Drug use: No        Allergies     No Known Allergies    Medications     Current Outpatient Medications   Medication Sig Dispense Refill    indomethacin (INDOCIN) 25 MG capsule Take 25 mg by mouth once      aspirin 81 MG tablet Take 81 mg by mouth daily      albuterol sulfate HFA (VENTOLIN HFA) 108 (90 Base) MCG/ACT inhaler Inhale 2 puffs into the lungs every 4 hours as needed for Wheezing or Shortness of Breath (Patient not taking: Reported on 2022) 1 Inhaler 3    vitamin D (CHOLECALCIFEROL) 1000 UNIT TABS tablet Take 1,000 Units by mouth daily (Patient not taking: Reported on 6/7/2022)      Omega-3 Fatty Acids (FISH OIL) 1000 MG CAPS Take 1,000 mg by mouth 2 times daily.  oxybutynin (DITROPAN) 5 MG tablet Take 1 tablet by mouth 3 times daily for 7 days. 21 tablet 0     No current facility-administered medications for this visit. Review of Systems     Review of Systems      PhysicalExam     Vitals:    06/07/22 1051   BP: (!) 141/79   Site: Right Upper Arm   Position: Sitting   Pulse: 50   Temp: 98.2 °F (36.8 °C)   TempSrc: Infrared   Weight: 198 lb (89.8 kg)   Height: 5' 7.3\" (1.709 m)       Physical Exam      Data Review        Procedure     ***    Jackson Hanson MD  6/7/22    Portions of this note were dictated using Dragon.  There may be linguistic errors secondary to the use of this program.

## 2022-06-07 NOTE — PROGRESS NOTES
[facility], [address], [address], [facility] Albert Aguilar  P: [phone]       Patient     Florina Martinez  1955    Chief Concern     Chief Complaint   Patient presents with    Tinnitus     Patient is here for tinnitus in both ears. He states it has been going on for 20 years atleast. Patient staes he thought his hearing was fine but according to hearing test some hearing loss. Assessment and Plan      Diagnosis Orders   1. Tinnitus of both ears  Keeley Layne Lorimor Maya FINNEGAN, Audiology, [facility]   2. Tympanic membrane retraction, bilateral     3. Sensorineural hearing loss (SNHL) of both ears       To 9year-old male with long history of tinnitus, nonintrusive. On examination he has a large left pars flaccida retraction pocket however no epithelial ingrowth/cholesteatoma noted, able to auto insufflate ears with full view of the retraction pocket. Small retraction pocket on the right side. We will continue serial examinations and return in 1 year with audiometric testing. Recommended conservative management (masking) of tinnitus, if no improvement we can see him back in follow-up for hearing evaluation. No follow-ups on file. History of Present Illness     Concern for tinnitus ongoing for the past 20 years, bilateral, qualitatively high pitched ringing - non-pulsatile. Nonintrusive, worst at night in bed. Has not tried any medication to fix this. 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 Has history of cutaneous sarcoidosis. Not currently on steroids/immunosuppressives. Quit smoking 1987.      Past Medical History     Past Medical History:   Diagnosis Date    Arthritis     Prostate enlargement     Prostate enlargement     Sarcoidosis        Past Surgical History     Past Surgical History:   Procedure Laterality Date    COLONOSCOPY 2012    ELBOW SURGERY      HERNIA REPAIR      SHOULDER SURGERY  2005    right    SHOULDER SURGERY Left 13    LEFT SHOULDER ARTHROSCOPY SUBACROMIAL DECOMPRESSION ROTATOR CUFF REPAIR    SHOULDER SURGERY Left 14    LEFT SHOULDER ARTHROSCOPY ROTATOR CUFF REVISION    TONSILLECTOMY      TURP         Family History     No family history on file. Social History     Social History     Tobacco Use    Smoking status: Former Smoker     Quit date: 12/3/1987     Years since quittin.5    Smokeless tobacco: Never Used   Vaping Use    Vaping Use: Never used   Substance Use Topics    Alcohol use: No    Drug use: No        Allergies     No Known Allergies    Medications     Current Outpatient Medications   Medication Sig Dispense Refill    indomethacin (INDOCIN) 25 MG capsule Take 25 mg by mouth once      aspirin 81 MG tablet Take 81 mg by mouth daily      albuterol sulfate HFA (VENTOLIN HFA) 108 (90 Base) MCG/ACT inhaler Inhale 2 puffs into the lungs every 4 hours as needed for Wheezing or Shortness of Breath (Patient not taking: Reported on 2022) 1 Inhaler 3    vitamin D (CHOLECALCIFEROL) 1000 UNIT TABS tablet Take 1,000 Units by mouth daily (Patient not taking: Reported on 2022)      Omega-3 Fatty Acids (FISH OIL) 1000 MG CAPS Take 1,000 mg by mouth 2 times daily.  oxybutynin (DITROPAN) 5 MG tablet Take 1 tablet by mouth 3 times daily for 7 days. 21 tablet 0     No current facility-administered medications for this visit. Review of Systems     Review of Systems      PhysicalExam     Vitals:    22 1051   BP: (!) 141/79   Site: Right Upper Arm   Position: Sitting   Pulse: 50   Temp: 98.2 °F (36.8 °C)   TempSrc: Infrared   Weight: 198 lb (89.8 kg)   Height: 5' 7.3\" (1.709 m)       Physical Exam  Vitals reviewed. Constitutional:       Appearance: Normal appearance. HENT:      Head: Normocephalic and atraumatic.       Right Ear: Tympanic membrane, ear canal and external ear normal. There is no impacted cerumen. Left Ear: Ear canal and external ear normal. There is no impacted cerumen. Nose: No congestion or rhinorrhea. Mouth/Throat:      Lips: Pink. No lesions. Mouth: Mucous membranes are moist. No oral lesions. Tongue: No lesions. Tongue does not deviate from midline. Palate: No mass and lesions. Pharynx: Oropharynx is clear. Uvula midline. No oropharyngeal exudate or posterior oropharyngeal erythema. Eyes:      Extraocular Movements: Extraocular movements intact. Pupils: Pupils are equal, round, and reactive to light. Musculoskeletal:      Cervical back: Normal range of motion and neck supple. Lymphadenopathy:      Cervical: No cervical adenopathy. Neurological:      Mental Status: He is alert. Cranial Nerves: No cranial nerve deficit. Data Review        Procedure     Binocular Otomicroscopy     Pre op: Left pars flaccida retraction  Post op: Bilateral pars flaccida retractions, left greater than sign right  Procedure : Binocular otomicroscopy  Surgeon: MARKUS Guajardo  Estimated Blood Loss: None    After obtaining consent, the patient was placed in the examination chair in the reclined position.      -Right ear: External auditory canal with no stenosis, tympanic membrane showing small right pars flaccida retraction, middle ear clear  -Left ear: External auditory canal with no stenosis, tympanic membrane showing large pars flaccida retractionno epithelial ingrowth, on auto insufflation there is atelectasis of the tympanic membrane in the pars flaccida with good view of the entire retraction, middle ear appears clear     * Patient tolerated the procedure well with no complications      Marisela Varma MD  6/7/22    Portions of this note were dictated using Dragon.  There may be linguistic errors secondary to the use of this program.

## 2022-10-03 ENCOUNTER — HOSPITAL ENCOUNTER (OUTPATIENT)
Dept: GENERAL RADIOLOGY | Age: 67
Discharge: HOME | End: 2022-10-03
Payer: COMMERCIAL

## 2022-10-03 ENCOUNTER — HOSPITAL ENCOUNTER (OUTPATIENT)
Age: 67
Discharge: HOME | End: 2022-10-03
Payer: COMMERCIAL

## 2022-10-03 DIAGNOSIS — R07.81 ANTERIOR PLEURITIC PAIN: ICD-10-CM

## 2022-10-03 PROCEDURE — 71101 X-RAY EXAM UNILAT RIBS/CHEST: CPT

## 2023-12-07 ENCOUNTER — PROCEDURE VISIT (OUTPATIENT)
Dept: AUDIOLOGY | Age: 68
End: 2023-12-07
Payer: MEDICARE

## 2023-12-07 ENCOUNTER — OFFICE VISIT (OUTPATIENT)
Dept: ENT CLINIC | Age: 68
End: 2023-12-07
Payer: MEDICARE

## 2023-12-07 VITALS
HEART RATE: 50 BPM | RESPIRATION RATE: 16 BRPM | SYSTOLIC BLOOD PRESSURE: 126 MMHG | WEIGHT: 193 LBS | BODY MASS INDEX: 29.25 KG/M2 | DIASTOLIC BLOOD PRESSURE: 79 MMHG | HEIGHT: 68 IN

## 2023-12-07 DIAGNOSIS — H90.3 SENSORINEURAL HEARING LOSS, BILATERAL: Primary | ICD-10-CM

## 2023-12-07 DIAGNOSIS — H93.13 TINNITUS OF BOTH EARS: Primary | ICD-10-CM

## 2023-12-07 DIAGNOSIS — H90.3 ASYMMETRIC SNHL (SENSORINEURAL HEARING LOSS): ICD-10-CM

## 2023-12-07 DIAGNOSIS — H93.13 TINNITUS OF BOTH EARS: ICD-10-CM

## 2023-12-07 DIAGNOSIS — H90.3 SENSORINEURAL HEARING LOSS (SNHL) OF BOTH EARS: ICD-10-CM

## 2023-12-07 PROCEDURE — 92557 COMPREHENSIVE HEARING TEST: CPT | Performed by: AUDIOLOGIST

## 2023-12-07 PROCEDURE — 99214 OFFICE O/P EST MOD 30 MIN: CPT | Performed by: OTOLARYNGOLOGY

## 2023-12-07 PROCEDURE — 92567 TYMPANOMETRY: CPT | Performed by: AUDIOLOGIST

## 2023-12-07 PROCEDURE — 1123F ACP DISCUSS/DSCN MKR DOCD: CPT | Performed by: OTOLARYNGOLOGY

## 2023-12-07 ASSESSMENT — ENCOUNTER SYMPTOMS
SHORTNESS OF BREATH: 0
SORE THROAT: 0
VOICE CHANGE: 0
TROUBLE SWALLOWING: 0
APNEA: 0
EYE ITCHING: 0
FACIAL SWELLING: 0
SINUS PRESSURE: 0
COUGH: 0

## 2023-12-07 NOTE — PROGRESS NOTES
[address], [address] [place] [address], [address] [place] [address],[address]  P: [phone]       Patient     Valente Bamberger  1955    ChiefComplaint     Chief Complaint   Patient presents with    Tinnitus     Patient states they have a ring in their ear for several years       History of Present Illness     Gino Faisal is a 61-year-old male here today for follow-up regarding bilateral tinnitus and hearing loss. Last seen 2022 by my partner. States that since then tinnitus has continued to increase in intensity. Is unaware of any difference between the ears. Denies ear fullness or pressure. History of loud noise exposure through work and hobby. Past Medical History     Past Medical History:   Diagnosis Date    Arthritis     Prostate enlargement     Prostate enlargement     Sarcoidosis        Past Surgical History     Past Surgical History:   Procedure Laterality Date    COLONOSCOPY      [facility]      SHOULDER SURGERY  2005    right    SHOULDER SURGERY Left 13    LEFT SHOULDER ARTHROSCOPY SUBACROMIAL DECOMPRESSION ROTATOR CUFF REPAIR    SHOULDER SURGERY Left 14    LEFT SHOULDER ARTHROSCOPY ROTATOR CUFF REVISION    TONSILLECTOMY      TURP         Family History     No family history on file.     Social History     Social History     Tobacco Use    Smoking status: Former     Types: Cigarettes     Quit date: 12/3/1987     Years since quittin.0    Smokeless tobacco: Never   Vaping Use    Vaping Use: Never used   Substance Use Topics    Alcohol use: No    Drug use: No        Allergies     No Known Allergies    Medications     Current Outpatient Medications   Medication Sig Dispense Refill    indomethacin (INDOCIN) 25 MG capsule Take 1 capsule by mouth once      albuterol sulfate HFA (VENTOLIN HFA) 108 (90 Base) MCG/ACT inhaler Inhale 2 puffs into the lungs every 4 hours as needed for Wheezing or Shortness of Breath (Patient not taking: Reported on 2022) 1

## 2023-12-07 NOTE — PROGRESS NOTES
Jessica Hayde   1955, 76 y.o. male   [id number]       Referring Provider: Jared Flores DO   Referral Type: In an order in [address] [place] [id number]    Reason for Visit: Re-evaluation of disorder as deemed medically necessary by referring provider    Coral Diaz is a 76 y.o. male seen today, 12/7/2023 , for a recheck audiologic evaluation. Patient was seen by Jared Flores DO  following today's evaluation. AUDIOLOGIC AND OTHER PERTINENT MEDICAL HISTORY:      Jessica Lock reports a potential perceived worsening of hearing sensitivity since his last evaluation. He is also reporting an increase in annoyance of his tinnitus. He denied otalgia, aural fullness, and otorrhea. Date: 12/7/2023     History from previous audiologic evaluation on 6/7/2022: Jessica Lock noted tinnitus, dizziness, history of recreational noise exposure, history of head trauma and family history of hearing loss. Patient reports recent constant bilateral tinnitus which was previously intermittent. Patient has experienced dizziness over 20 years ago triggered by getting out of bed. He treated his dizziness with Meclizine. Patient has a history of noise exposure, shooting and tools, with intermittent use of hearing protection. Patient has a family history of tinnitus. Medical history is reportedly significant for head injury. Abnormal middle ear pressure with normal compliance in the left ear and normal pressure and compliance in the right ear. Abnormal hearing sensitivity, bilaterally, which could contribute to the tinnitus. Word understanding was excellent presented at elevated sensation levels, bilaterally. Hearing aids are recommended at this time. IMPRESSIONS:      Today's results revealed essentially stable sensorineural hearing loss in comparison to his previous evaluation. Excellent speech understanding when in quiet. Tympanometry indicates  negative pressure of the left ear.